# Patient Record
Sex: FEMALE | Race: WHITE | Employment: OTHER | URBAN - METROPOLITAN AREA
[De-identification: names, ages, dates, MRNs, and addresses within clinical notes are randomized per-mention and may not be internally consistent; named-entity substitution may affect disease eponyms.]

---

## 2019-08-23 ENCOUNTER — HOSPITAL ENCOUNTER (EMERGENCY)
Age: 69
Discharge: HOME | End: 2019-08-23
Attending: EMERGENCY MEDICINE
Payer: MEDICARE

## 2019-08-23 ENCOUNTER — APPOINTMENT (OUTPATIENT)
Dept: CT IMAGING | Age: 69
End: 2019-08-23
Payer: MEDICARE

## 2019-08-23 VITALS
SYSTOLIC BLOOD PRESSURE: 159 MMHG | HEART RATE: 89 BPM | OXYGEN SATURATION: 94 % | HEIGHT: 65 IN | BODY MASS INDEX: 29.49 KG/M2 | WEIGHT: 177 LBS | TEMPERATURE: 98.1 F | RESPIRATION RATE: 16 BRPM | DIASTOLIC BLOOD PRESSURE: 84 MMHG

## 2019-08-23 DIAGNOSIS — S16.1XXA ACUTE STRAIN OF NECK MUSCLE, INITIAL ENCOUNTER: Primary | ICD-10-CM

## 2019-08-23 PROCEDURE — 72125 CT NECK SPINE W/O DYE: CPT

## 2019-08-23 PROCEDURE — 70450 CT HEAD/BRAIN W/O DYE: CPT

## 2019-08-23 PROCEDURE — 6370000000 HC RX 637 (ALT 250 FOR IP): Performed by: EMERGENCY MEDICINE

## 2019-08-23 PROCEDURE — 99283 EMERGENCY DEPT VISIT LOW MDM: CPT

## 2019-08-23 RX ORDER — OMEPRAZOLE 20 MG/1
20 CAPSULE, DELAYED RELEASE ORAL DAILY
COMMUNITY

## 2019-08-23 RX ORDER — OXYCODONE HYDROCHLORIDE AND ACETAMINOPHEN 5; 325 MG/1; MG/1
1 TABLET ORAL ONCE
Status: DISCONTINUED | OUTPATIENT
Start: 2019-08-23 | End: 2019-08-23

## 2019-08-23 RX ORDER — ATORVASTATIN CALCIUM 40 MG/1
40 TABLET, FILM COATED ORAL DAILY
COMMUNITY

## 2019-08-23 RX ORDER — ALBUTEROL SULFATE 90 UG/1
2 AEROSOL, METERED RESPIRATORY (INHALATION) EVERY 6 HOURS PRN
COMMUNITY

## 2019-08-23 RX ORDER — AMLODIPINE BESYLATE 2.5 MG/1
10 TABLET ORAL DAILY
COMMUNITY

## 2019-08-23 RX ORDER — CYCLOBENZAPRINE HCL 10 MG
10 TABLET ORAL ONCE
Status: COMPLETED | OUTPATIENT
Start: 2019-08-23 | End: 2019-08-23

## 2019-08-23 RX ORDER — METOPROLOL SUCCINATE 25 MG/1
25 TABLET, EXTENDED RELEASE ORAL DAILY
COMMUNITY

## 2019-08-23 RX ORDER — HYDROCODONE BITARTRATE AND ACETAMINOPHEN 5; 325 MG/1; MG/1
1 TABLET ORAL ONCE
Status: COMPLETED | OUTPATIENT
Start: 2019-08-23 | End: 2019-08-23

## 2019-08-23 RX ORDER — DULOXETIN HYDROCHLORIDE 30 MG/1
30 CAPSULE, DELAYED RELEASE ORAL DAILY
COMMUNITY

## 2019-08-23 RX ORDER — ALENDRONATE SODIUM 70 MG/1
70 TABLET ORAL
COMMUNITY

## 2019-08-23 RX ORDER — CALCIUM CARBONATE 500(1250)
500 TABLET ORAL DAILY
COMMUNITY

## 2019-08-23 RX ORDER — TIZANIDINE 4 MG/1
4 TABLET ORAL NIGHTLY PRN
Qty: 12 TABLET | Refills: 0 | Status: SHIPPED | OUTPATIENT
Start: 2019-08-23

## 2019-08-23 RX ORDER — ACETAMINOPHEN 500 MG
500 TABLET ORAL EVERY 6 HOURS PRN
COMMUNITY

## 2019-08-23 RX ORDER — MONTELUKAST SODIUM 10 MG/1
10 TABLET ORAL NIGHTLY
COMMUNITY

## 2019-08-23 RX ORDER — CLOPIDOGREL BISULFATE 75 MG/1
75 TABLET ORAL DAILY
COMMUNITY

## 2019-08-23 RX ORDER — FERROUS SULFATE 325(65) MG
325 TABLET ORAL
COMMUNITY

## 2019-08-23 RX ORDER — HYDROCODONE BITARTRATE AND ACETAMINOPHEN 5; 325 MG/1; MG/1
1 TABLET ORAL EVERY 6 HOURS PRN
Qty: 10 TABLET | Refills: 0 | Status: SHIPPED | OUTPATIENT
Start: 2019-08-23 | End: 2019-08-25

## 2019-08-23 RX ORDER — TORSEMIDE 10 MG/1
10 TABLET ORAL DAILY
COMMUNITY

## 2019-08-23 RX ADMIN — CYCLOBENZAPRINE HYDROCHLORIDE 10 MG: 10 TABLET, FILM COATED ORAL at 17:51

## 2019-08-23 RX ADMIN — HYDROCODONE BITARTRATE AND ACETAMINOPHEN 1 TABLET: 5; 325 TABLET ORAL at 17:51

## 2019-08-23 ASSESSMENT — PAIN DESCRIPTION - ORIENTATION: ORIENTATION: RIGHT

## 2019-08-23 ASSESSMENT — PAIN SCALES - GENERAL
PAINLEVEL_OUTOF10: 7

## 2019-08-23 ASSESSMENT — PAIN DESCRIPTION - LOCATION: LOCATION: NECK

## 2019-08-23 ASSESSMENT — PAIN DESCRIPTION - PAIN TYPE: TYPE: ACUTE PAIN

## 2019-08-23 NOTE — ED PROVIDER NOTES
[facility] Emergency Department    CHIEF COMPLAINT  Torticollis (patient states neck pain started yesterday and reports today the pain became sharper and stiff. pt has been txt with icy hot patches. ) and Neck Pain      HISTORY OF PRESENT ILLNESS  [place] is a 71 y.o. female presenting today to the ER with neck pain. Patient says that she lost her balance and fell out of a chair on Monday. When she fell she injured her neck. She has had persistent pain in her neck since that time. She described as an aching tension type of pain mostly on the right side of the neck. She denies loss of consciousness. Patient denies any injuries to her arms or legs and has no focal areas of weakness or numbness. No other complaints, modifying factors or associated symptoms. I have reviewed the following from the nursing documentation. Past Medical History:   Diagnosis Date    Carotid stenosis     COPD (chronic obstructive pulmonary disease) (HCC)     GERD (gastroesophageal reflux disease)     Hyperlipidemia     Hypertension     Pneumonia     TIA (transient ischemic attack)      Past Surgical History:   Procedure Laterality Date    BLADDER SURGERY      CHOLECYSTECTOMY      CORONARY ANGIOPLASTY WITH STENT PLACEMENT      EYE SURGERY      HEMORRHOIDECTOMY      HYSTERECTOMY      JOINT REPLACEMENT      SINUS SURGERY      TONSILLECTOMY      ULNAR TUNNEL RELEASE      URETHRAL SURGERY       History reviewed. No pertinent family history.   Social History     Socioeconomic History    Marital status:      Spouse name: Not on file    Number of children: Not on file    Years of education: Not on file    Highest education level: Not on file   Occupational History    Not on file   Social Needs    Financial resource strain: Not on file    Food insecurity:     Worry: Not on file     Inability: Not on file    Transportation needs:     Medical: Not on file     Non-medical: Not on daily      montelukast (SINGULAIR) 10 MG tablet Take 10 mg by mouth nightly      folic acid-pyridoxine-cyancobalamin (FOLBIC) 2.5-25-2 MG TABS Take 1 tablet by mouth daily      polyethyl glycol-propyl glycol 0.4-0.3 % (SYSTANE) 0.4-0.3 % ophthalmic solution 1 drop as needed for Dry Eyes      calcium carbonate (OSCAL) 500 MG TABS tablet Take 500 mg by mouth daily      ferrous sulfate 325 (65 Fe) MG tablet Take 325 mg by mouth daily (with breakfast)      GLUCOSAMINE-CALCIUM-VIT D PO Take by mouth      HYDROcodone-acetaminophen (NORCO) 5-325 MG per tablet Take 1 tablet by mouth every 6 hours as needed for Pain for up to 10 doses. 10 tablet 0    tiZANidine (ZANAFLEX) 4 MG tablet Take 1 tablet by mouth nightly as needed (PRN neck pain) 12 tablet 0     Allergies   Allergen Reactions    Beta Adrenergic Blockers Shortness Of Breath    Penicillins Itching    Morphine Nausea And Vomiting    Percocet [Oxycodone-Acetaminophen] Nausea And Vomiting       REVIEW OF SYSTEMS  10 systems reviewed, pertinent positives per HPI otherwise noted to be negative. PHYSICAL EXAM  BP (!) 159/84   Pulse 89   Temp 98.1 °F (36.7 °C) (Oral)   Resp 16   Ht 5' 5\" (1.651 m)   Wt 177 lb (80.3 kg)   SpO2 94%   BMI 29.45 kg/m²   GENERAL APPEARANCE: Awake and alert. Cooperative  HEAD: Normocephalic. Atraumatic. EYES: PERRL. EOM's grossly intact. ENT: Mucous membranes are moist.   NECK: Supple. Midline and right-sided neck tenderness to palpation  HEART: RRR. LUNGS: Respirations unlabored. CTAB. Good air exchange  ABDOMEN: Soft. Non-distended. Non-tender. No masses. No organomegaly. No guarding or rebound. BACK:  No midline Tenderness. EXTREMITIES: No peripheral edema. Moves all extremities equally. All extremities neurovascularly intact. SKIN: Warm and dry. No acute rashes. NEUROLOGICAL: Alert and oriented. CN's 2-12 intact. No gross facial drooping. Strength 5/5, sensation intact.   PSYCHIATRIC: Normal mood and Initial Relevant Medical/Surgical History: no hx of stroke or siezure; tremors, unable to hold head still entirely FINDINGS: HEAD CT: Motion artifact degrades some of the images. BRAIN/VENTRICLES: There is no acute intracranial hemorrhage, mass effect or midline shift. No abnormal extra-axial fluid collection. The gray-white differentiation is maintained without evidence of an acute infarct. There is no evidence of hydrocephalus. There is mild abnormal nonspecific low-attenuation within the periventricular subcortical white matter, likely representing chronic ischemic microvascular change. ORBITS: The visualized portion of the orbits demonstrate no acute abnormality. SINUSES: The visualized paranasal sinuses and mastoid air cells demonstrate no acute abnormality. SOFT TISSUES/SKULL:  No acute abnormality of the visualized skull or soft tissues. CERVICAL SPINE CT: Bones/alignment: There is minimal 1-2 mm anterolisthesis at C7-T1, likely degenerative; the posterior elements remain aligned. There is preservation of vertebral body heights no acute fracture. Degenerative changes: Mild multilevel degenerative disc disease identified, greatest at C6-7. At C6-7, disc space narrowing and spurring is greater toward the right. A small focus of gas in the far right paracentral anterior spinal canal could represent small protrusion. No significant central canal narrowing is evident. Facet hypertrophy changes are also present multiple levels, moderate to severe on the right. Neural foraminal narrowing is noted at multiple levels, particularly on the right at C5-6 and C6-7. Soft tissues: No paraspinal hematoma is identified. Dense arterial vascular calcifications are present. A stent is present within the upper left common carotid artery     No acute intracranial abnormality. No acute abnormality of the cervical spine. ED COURSE/MDM  Patient seen and evaluated. Old records reviewed.  Labs and imaging reviewed and